# Patient Record
Sex: MALE | Race: BLACK OR AFRICAN AMERICAN | URBAN - METROPOLITAN AREA
[De-identification: names, ages, dates, MRNs, and addresses within clinical notes are randomized per-mention and may not be internally consistent; named-entity substitution may affect disease eponyms.]

---

## 2019-01-22 ENCOUNTER — OFFICE VISIT (OUTPATIENT)
Dept: FAMILY MEDICINE CLINIC | Age: 39
End: 2019-01-22

## 2019-01-22 ENCOUNTER — HOSPITAL ENCOUNTER (OUTPATIENT)
Dept: LAB | Age: 39
Discharge: HOME | End: 2019-01-22
Payer: SELF-PAY

## 2019-01-22 VITALS
BODY MASS INDEX: 35.34 KG/M2 | SYSTOLIC BLOOD PRESSURE: 210 MMHG | DIASTOLIC BLOOD PRESSURE: 110 MMHG | OXYGEN SATURATION: 99 % | HEART RATE: 65 BPM | WEIGHT: 275.4 LBS | RESPIRATION RATE: 14 BRPM | HEIGHT: 74 IN | TEMPERATURE: 97.7 F

## 2019-01-22 DIAGNOSIS — I10 HYPERTENSION, UNCONTROLLED: Primary | ICD-10-CM

## 2019-01-22 DIAGNOSIS — I10 HYPERTENSION, UNCONTROLLED: ICD-10-CM

## 2019-01-22 PROBLEM — E66.01 SEVERE OBESITY (HCC): Status: ACTIVE | Noted: 2019-01-22

## 2019-01-22 LAB
ALBUMIN SERPL-MCNC: 4.4 G/DL (ref 3.4–5)
ALBUMIN/GLOB SERPL: 1.2 {RATIO} (ref 0.8–1.7)
ALP SERPL-CCNC: 77 U/L (ref 45–117)
ALT SERPL-CCNC: 27 U/L (ref 16–61)
ANION GAP SERPL CALC-SCNC: 8 MMOL/L (ref 3–18)
AST SERPL-CCNC: 14 U/L (ref 15–37)
BILIRUB SERPL-MCNC: 0.5 MG/DL (ref 0.2–1)
BUN SERPL-MCNC: 13 MG/DL (ref 7–18)
BUN/CREAT SERPL: 14 (ref 12–20)
CALCIUM SERPL-MCNC: 9.5 MG/DL (ref 8.5–10.1)
CHLORIDE SERPL-SCNC: 104 MMOL/L (ref 100–108)
CO2 SERPL-SCNC: 28 MMOL/L (ref 21–32)
CREAT SERPL-MCNC: 0.92 MG/DL (ref 0.6–1.3)
GLOBULIN SER CALC-MCNC: 3.8 G/DL (ref 2–4)
GLUCOSE SERPL-MCNC: 91 MG/DL (ref 74–99)
POTASSIUM SERPL-SCNC: 4.4 MMOL/L (ref 3.5–5.5)
PROT SERPL-MCNC: 8.2 G/DL (ref 6.4–8.2)
SODIUM SERPL-SCNC: 140 MMOL/L (ref 136–145)

## 2019-01-22 PROCEDURE — 80053 COMPREHEN METABOLIC PANEL: CPT

## 2019-01-22 RX ORDER — AMLODIPINE BESYLATE 10 MG/1
10 TABLET ORAL DAILY
Qty: 30 TAB | Refills: 1 | Status: SHIPPED | OUTPATIENT
Start: 2019-01-22 | End: 2019-03-21 | Stop reason: SDUPTHER

## 2019-01-22 RX ORDER — LISINOPRIL 40 MG/1
40 TABLET ORAL DAILY
COMMUNITY
End: 2019-01-22 | Stop reason: SDUPTHER

## 2019-01-22 RX ORDER — LISINOPRIL 40 MG/1
40 TABLET ORAL DAILY
Qty: 30 TAB | Refills: 2 | Status: SHIPPED | OUTPATIENT
Start: 2019-01-22 | End: 2019-04-28 | Stop reason: SDUPTHER

## 2019-01-22 NOTE — TELEPHONE ENCOUNTER
Pt was in the office today for an appt.  He did not get refill on   Following medication(s) for Felicia Settler 1980 uploaded and pended for review to Dr. Vera Number :   lisinopril (PRINIVIL, ZESTRIL) 40 mg tablet [[id number]]   Order Details   Dose: 40 mg Route: Oral Frequency: DAILY   Dispense Quantity: -- Refills: -- Fills remaining: --           Sig: Take 40 mg by mouth daily.

## 2019-01-22 NOTE — PROGRESS NOTES
Vee Escalona is a 45 y.o. male  presents for establish care. He has history of HTN and is on ace. No complaints. No Known Allergies  Outpatient Medications Marked as Taking for the 1/22/19 encounter (Office Visit) with Lisha Muro, MD   Medication Sig Dispense Refill    lisinopril (PRINIVIL, ZESTRIL) 40 mg tablet Take 40 mg by mouth daily.  amLODIPine (NORVASC) 10 mg tablet Take 1 Tab by mouth daily. 30 Tab 1     Patient Active Problem List   Diagnosis Code    Severe obesity ([facility] [address] E66.01    Hypertension, uncontrolled I10     Past Medical History:   Diagnosis Date    Hypertension      Social History     Socioeconomic History    Marital status:      Spouse name: Not on file    Number of children: Not on file    Years of education: Not on file    Highest education level: Not on file   Tobacco Use    Smoking status: Former Smoker    Smokeless tobacco: Never Used   Substance and Sexual Activity    Alcohol use: Yes     Comment: Occasional      Family History   Problem Relation Age of Onset    Hypertension Mother     Hypertension Father         Review of Systems   Constitutional: Negative for chills, fever, malaise/fatigue and weight loss. Eyes: Negative for blurred vision. Respiratory: Negative for shortness of breath and wheezing. Cardiovascular: Negative for chest pain. Gastrointestinal: Negative for nausea and vomiting. Musculoskeletal: Negative for myalgias. Skin: Negative for rash. Neurological: Negative for weakness. Psychiatric/Behavioral: Negative. Vitals:    01/22/19 1055   BP: (!) 210/110   Pulse: 65   Resp: 14   Temp: 97.7 °F (36.5 °C)   TempSrc: Oral   SpO2: 99%   Weight: 275 lb 6.4 oz (124.9 kg)   Height: 6' 2\" (1.88 m)   PainSc:   0 - No pain       Physical Exam   Constitutional: He is oriented to person, place, and time and well-developed, well-nourished, and in no distress. Neck: Normal range of motion. Neck supple. No thyromegaly present. Cardiovascular: Normal rate, regular rhythm and normal heart sounds. Pulmonary/Chest: Effort normal and breath sounds normal.   Musculoskeletal: Normal range of motion. Neurological: He is alert and oriented to person, place, and time. Skin: Skin is warm and dry. Nursing note and vitals reviewed. Assessment/Plan      ICD-10-CM ICD-9-CM    1. Hypertension, uncontrolled I10 401.9 amLODIPine (NORVASC) 10 mg tablet      METABOLIC PANEL, COMPREHENSIVE     Continue Lisinopril but add norvasc    I have discussed the diagnosis with the patient and the intended plan of care as seen in the above orders. The patient has received an after-visit summary and questions were answered concerning future plans. I have discussed medication, side effects, and warnings with the patient in detail. The patient verbalized understanding and is in agreement with the plan of care. The patient will contact the office with any additional concerns. Follow-up Disposition:  Return in about 6 days (around 1/28/2019).   lab results and schedule of future lab studies reviewed with patient    Lina Chung MD

## 2019-01-22 NOTE — PATIENT INSTRUCTIONS
Low Sodium Diet (2,000 Milligram): Care Instructions  Your Care Instructions    Too much sodium causes your body to hold on to extra water. This can raise your blood pressure and force your heart and kidneys to work harder. In very serious cases, this could cause you to be put in the hospital. It might even be life-threatening. By limiting sodium, you will feel better and lower your risk of serious problems. The most common source of sodium is salt. People get most of the salt in their diet from canned, prepared, and packaged foods. Fast food and restaurant meals also are very high in sodium. Your doctor will probably limit your sodium to less than 2,000 milligrams (mg) a day. This limit counts all the sodium in prepared and packaged foods and any salt you add to your food. Follow-up care is a key part of your treatment and safety. Be sure to make and go to all appointments, and call your doctor if you are having problems. It's also a good idea to know your test results and keep a list of the medicines you take. How can you care for yourself at home? Read food labels  · Read labels on cans and food packages. The labels tell you how much sodium is in each serving. Make sure that you look at the serving size. If you eat more than the serving size, you have eaten more sodium. · Food labels also tell you the Percent Daily Value for sodium. Choose products with low Percent Daily Values for sodium. · Be aware that sodium can come in forms other than salt, including monosodium glutamate (MSG), sodium citrate, and sodium bicarbonate (baking soda). MSG is often added to Asian food. When you eat out, you can sometimes ask for food without MSG or added salt. Buy low-sodium foods  · Buy foods that are labeled \"unsalted\" (no salt added), \"sodium-free\" (less than 5 mg of sodium per serving), or \"low-sodium\" (less than 140 mg of sodium per serving).  Foods labeled \"reduced-sodium\" and \"light sodium\" may still have too much sodium. Be sure to read the label to see how much sodium you are getting. · Buy fresh vegetables, or frozen vegetables without added sauces. Buy low-sodium versions of canned vegetables, soups, and other canned goods. Prepare low-sodium meals  · Cut back on the amount of salt you use in cooking. This will help you adjust to the taste. Do not add salt after cooking. One teaspoon of salt has about 2,300 mg of sodium. · Take the salt shaker off the table. · Flavor your food with garlic, lemon juice, onion, vinegar, herbs, and spices. Do not use soy sauce, lite soy sauce, steak sauce, onion salt, garlic salt, celery salt, mustard, or ketchup on your food. · Use low-sodium salad dressings, sauces, and ketchup. Or make your own salad dressings and sauces without adding salt. · Use less salt (or none) when recipes call for it. You can often use half the salt a recipe calls for without losing flavor. Other foods such as rice, pasta, and grains do not need added salt. · Rinse canned vegetables, and cook them in fresh water. This removes some--but not all--of the salt. · Avoid water that is naturally high in sodium or that has been treated with water softeners, which add sodium. Call your local water company to find out the sodium content of your water supply. If you buy bottled water, read the label and choose a sodium-free brand. Avoid high-sodium foods  · Avoid eating:  ? Smoked, cured, salted, and canned meat, fish, and poultry. ? Ham, pinzon, hot dogs, and luncheon meats. ? Regular, hard, and processed cheese and regular peanut butter. ? Crackers with salted tops, and other salted snack foods such as pretzels, chips, and salted popcorn. ? Frozen prepared meals, unless labeled low-sodium. ? Canned and dried soups, broths, and bouillon, unless labeled sodium-free or low-sodium. ? Canned vegetables, unless labeled sodium-free or low-sodium. ? Western Aimee fries, pizza, tacos, and other fast foods.   ? Cassi Will olives, ketchup, and other condiments, especially soy sauce, unless labeled sodium-free or low-sodium. Where can you learn more? Go to http://ghulam-rabia.info/. Enter K302 in the search box to learn more about \"Low Sodium Diet (2,000 Milligram): Care Instructions. \"  Current as of: March 28, 2018  Content Version: 11.9  © 0495-3796 [employer]. Care instructions adapted under license by [employer] (which disclaims liability or warranty for this information). If you have questions about a medical condition or this instruction, always ask your healthcare professional. [address] any warranty or liability for your use of this information.

## 2019-01-28 ENCOUNTER — CLINICAL SUPPORT (OUTPATIENT)
Dept: FAMILY MEDICINE CLINIC | Age: 39
End: 2019-01-28

## 2019-01-28 VITALS — DIASTOLIC BLOOD PRESSURE: 100 MMHG | SYSTOLIC BLOOD PRESSURE: 160 MMHG

## 2019-01-28 DIAGNOSIS — I10 HYPERTENSION, UNCONTROLLED: Primary | ICD-10-CM

## 2019-01-28 NOTE — PROGRESS NOTES
[place] Elam 45 y.o. male being seen BP check     Bp was 160/100 the patient stated he has no headache, SOB, Chest pain, or nausea    Informed Dr. Vicki Coello of BP he said to have the patient return for nurse visit in 2 weeks for BP f/u.    the patient expressed clear understanding and did not have any questions.     SMD

## 2019-03-21 DIAGNOSIS — I10 HYPERTENSION, UNCONTROLLED: ICD-10-CM

## 2019-03-21 RX ORDER — AMLODIPINE BESYLATE 10 MG/1
10 TABLET ORAL DAILY
Qty: 30 TAB | Refills: 1 | Status: SHIPPED | OUTPATIENT
Start: 2019-03-21 | End: 2019-05-21 | Stop reason: SDUPTHER

## 2019-03-21 NOTE — TELEPHONE ENCOUNTER
Patient called at 10:03 requesting a refill on his amLODIPine (NORVASC) 10 mg tablet. Please call patient if you have any questions.

## 2019-03-21 NOTE — TELEPHONE ENCOUNTER
Patient was seen on 1/22/19 for HTN (initial visit) did not come back for his nurse visit (no showed). Asking for refills on his blood pressure medication please advise.

## 2019-04-29 RX ORDER — LISINOPRIL 40 MG/1
TABLET ORAL
Qty: 30 TAB | Refills: 2 | Status: SHIPPED | OUTPATIENT
Start: 2019-04-29 | End: 2019-08-10 | Stop reason: SDUPTHER

## 2019-05-21 DIAGNOSIS — I10 HYPERTENSION, UNCONTROLLED: ICD-10-CM

## 2019-05-21 RX ORDER — AMLODIPINE BESYLATE 10 MG/1
TABLET ORAL
Qty: 30 TAB | Refills: 1 | Status: SHIPPED | OUTPATIENT
Start: 2019-05-21 | End: 2019-07-21 | Stop reason: SDUPTHER

## 2019-07-21 DIAGNOSIS — I10 HYPERTENSION, UNCONTROLLED: ICD-10-CM

## 2019-07-22 RX ORDER — AMLODIPINE BESYLATE 10 MG/1
TABLET ORAL
Qty: 30 TAB | Refills: 1 | Status: SHIPPED | OUTPATIENT
Start: 2019-07-22 | End: 2019-09-26 | Stop reason: SDUPTHER

## 2019-08-12 RX ORDER — LISINOPRIL 40 MG/1
TABLET ORAL
Qty: 30 TAB | Refills: 2 | Status: SHIPPED | OUTPATIENT
Start: 2019-08-12 | End: 2021-08-23

## 2019-09-26 DIAGNOSIS — I10 HYPERTENSION, UNCONTROLLED: ICD-10-CM

## 2019-09-26 RX ORDER — AMLODIPINE BESYLATE 10 MG/1
TABLET ORAL
Qty: 30 TAB | Refills: 1 | Status: SHIPPED | OUTPATIENT
Start: 2019-09-26 | End: 2021-10-05 | Stop reason: SDUPTHER

## 2019-11-20 RX ORDER — LISINOPRIL 40 MG/1
TABLET ORAL
Qty: 30 TAB | Refills: 1 | OUTPATIENT
Start: 2019-11-20

## 2019-11-25 RX ORDER — LISINOPRIL 40 MG/1
TABLET ORAL
Qty: 30 TAB | Refills: 1 | OUTPATIENT
Start: 2019-11-25

## 2019-12-03 ENCOUNTER — TELEPHONE (OUTPATIENT)
Dept: FAMILY MEDICINE CLINIC | Age: 39
End: 2019-12-03

## 2019-12-03 NOTE — TELEPHONE ENCOUNTER
Mr. Erum Lay called this morning to request a refill on his blood pressure medication. I informed him that he hasn't been seen since January 2019 and that a follow up is required. He states he doesn't have insurance and that there was a money issue last time. I explained that we would be more than happy to work with him on payments. He insisted that Dr. Akash Glover would fill his medication without him being seen for an appointment. He stated his wife was in here earlier & Dr. Akash Glover informed her to tell Mr. Erum Lay to call up here to get his refill. I again explained that an appointment would be necessary, as it's been almost a year. He asked to speak with Dr. Akash Glover directly. I informed him I would pass a message along but Dr. Akash Glover was unavailable to talk right now. He was persistent even still following that. I asked him to hold. I went back to Dr. Zbigniew Arroyo office, consulted with him about the situation. Dr. Akash Glover said he did not speak to his wife about anything, and the lady he seen this morning, he didn't even know she was . I informed Dr. Akash Glover that patient was insisting that he said for him to call up here. Dr. Akash Glover didn't say any of that. Dr. Akash Glover advised me that the patient needs an appointment & needs to be seen. At that moment, I took the call in Dr. Zbigniew Arroyo office, informed the patient exactly what Dr. Akash Glover said. Mr. Erum Lay became furious and said \"this is fucking outrageous, y'all are out about fucking money, fuck you. \"     Call was disconnected. Dr. Akash Glover aware of what the patient said and suggested he not be seen here again. Msg was forwarded to [facility] for review.

## 2019-12-11 NOTE — TELEPHONE ENCOUNTER
I spoke with Dr. Darell Booker who had expressed no longer wanting to treat this patient after his language used in his conversation with [facility] and then patient never followed recommendation to schedule an appointment. I discussed this case also with my manager, Margareth Garcia. Discharge letter was sent today 12/11/19. Will provide care through 1/11/2020.

## 2020-09-21 ENCOUNTER — OFFICE VISIT (OUTPATIENT)
Dept: ORTHOPEDIC SURGERY | Facility: CLINIC | Age: 40
End: 2020-09-21

## 2020-09-21 VITALS
BODY MASS INDEX: 33.65 KG/M2 | HEART RATE: 72 BPM | HEIGHT: 74 IN | RESPIRATION RATE: 16 BRPM | TEMPERATURE: 97.1 F | SYSTOLIC BLOOD PRESSURE: 147 MMHG | WEIGHT: 262.2 LBS | DIASTOLIC BLOOD PRESSURE: 94 MMHG

## 2020-09-21 DIAGNOSIS — M17.11 PRIMARY OSTEOARTHRITIS OF RIGHT KNEE: ICD-10-CM

## 2020-09-21 DIAGNOSIS — M25.562 LEFT KNEE PAIN, UNSPECIFIED CHRONICITY: ICD-10-CM

## 2020-09-21 DIAGNOSIS — M25.462 EFFUSION OF LEFT KNEE: ICD-10-CM

## 2020-09-21 DIAGNOSIS — M17.12 PRIMARY OSTEOARTHRITIS OF LEFT KNEE: Primary | ICD-10-CM

## 2020-09-21 RX ORDER — BETAMETHASONE SODIUM PHOSPHATE AND BETAMETHASONE ACETATE 3; 3 MG/ML; MG/ML
6 INJECTION, SUSPENSION INTRA-ARTICULAR; INTRALESIONAL; INTRAMUSCULAR; SOFT TISSUE ONCE
Qty: 0.5 ML | Refills: 0
Start: 2020-09-21 | End: 2020-09-21

## 2020-09-21 NOTE — PROGRESS NOTES
Patient: Morgan Zamudio                MRN: [id number]       SSN: [id number]  YOB: 1980        AGE: 44 y.o. SEX: male    PCP: No primary care provider on file.  09/21/20    CC: LEFT KNEE PAIN AND LEFT KNEE EFFUSION    HISTORY:  Morgan Zamudio is a 44 y.o. male who is seen for left knee pain. He has been experiencing left knee pain for the past month with increased pain this past week. He has been running around a lot with his newly acquired beagle puppy. He has been doing a lot of kneeling at work as a . He also finishing a [employer]  job. He states he has had prior knee swelling episodes while playing basketball years ago. He feels pain with standing, walking and stair climbing. He experiences startup pain after sitting. He has been wearing a Shankar Copper knee brace with some relief. 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 He lives in [place] with his wife and special needs brother-in-law. He has a daughter that goes to [employer]. He has an 5 week old beagle named Angelica, but he calls her Little Kenyain. Mr. Mariela Hesetr weighs 262 lbs and is 6'2\" tall.        No results found for: HBA1C, HGBE8, CYU4IYUH, JWG0ABOU, BQR7XISS  Weight Metrics 9/21/2020 1/22/2019   Weight 262 lb 3.2 oz 275 lb 6.4 oz   BMI 33.66 kg/m2 35.36 kg/m2       Patient Active Problem List   Diagnosis Code    Severe obesity ([facility] [address] E66.01    Hypertension, uncontrolled I10     REVIEW OF SYSTEMS:    Constitutional Symptoms: Negative   Eyes: Negative   Ears, Nose, Throat and Mouth: Negative   Cardiovascular: Negative   Respiratory: Negative   Genitourinary: Per HPI   Gastrointestinal: Per HPI   Integumentary (Skin and/or Breast): Negative   Musculoskeletal: Per HPI   Endocrine/Rheumatologic: Negative   Neurological: Per HPI   Hematology/Lymphatic: Negative    Allergic/Immunologic: Negative   Phychiatric: Negative    Social History     Socioeconomic History    Marital status:      Spouse name: Not on file    Number of children: Not on file    Years of education: Not on file    Highest education level: Not on file   Occupational History    Not on file   Social Needs    Financial resource strain: Not on file    Food insecurity     Worry: Not on file     Inability: Not on file    Transportation needs     Medical: Not on file     Non-medical: Not on file   Tobacco Use    Smoking status: Former Smoker    Smokeless tobacco: Never Used   Substance and Sexual Activity    Alcohol use: Yes     Comment: Occasional     Drug use: Not on file    Sexual activity: Not on file   Lifestyle    Physical activity     Days per week: Not on file     Minutes per session: Not on file    Stress: Not on file   Relationships    Social connections     Talks on phone: Not on file     Gets together: Not on file     Attends Nondenominational service: Not on file     Active member of club or organization: Not on file     Attends meetings of clubs or organizations: Not on file     Relationship status: Not on file    Intimate partner violence     Fear of current or ex partner: Not on file     Emotionally abused: Not on file     Physically abused: Not on file     Forced sexual activity: Not on file   Other Topics Concern    Not on file   Social History Narrative    Not on file      No Known Allergies   Current Outpatient Medications   Medication Sig    amLODIPine (NORVASC) 10 mg tablet TAKE 1 TABLET BY MOUTH ONCE DAILY    lisinopril (PRINIVIL, ZESTRIL) 40 mg tablet take 1 tablet by mouth once daily     No current facility-administered medications for this visit. PHYSICAL EXAMINATION:  Visit Vitals  BP (!) 147/94 (BP 1 Location: Left arm, BP Patient Position: Sitting)   Pulse 72   Temp 97.1 °F (36.2 °C) (Temporal)   Resp 16   Ht 6' 2\" (1.88 m)   Wt 262 lb 3.2 oz (118.9 kg)   BMI 33.66 kg/m²      ORTHO EXAMINATION:  Examination Right knee Left knee   Skin Intact Intact   Range of motion 120-0 115-0   Effusion - +++   Medial joint line tenderness - +   Lateral joint line tenderness - -   Popliteal tenderness - -   Osteophytes palpable - +   Liangs - -   Patella crepitus - -   Anterior drawer - -   Lateral laxity - -   Medial laxity - -   Varus deformity - -   Valgus deformity - -   Pretibial edema - -   Calf tenderness - -       TIME OUT:  Chart reviewed for the following:   IPatricia MD, have reviewed the History, Physical and updated the Allergic reactions for [address] performed immediately prior to start of procedure:  Maico Stanley MD, have performed the following reviews on Kayode Fears prior to the start of the procedure:          * Patient was identified by name and date of birth   * Agreement on procedure being performed was verified  * Risks and Benefits explained to the patient  * Procedure site verified and marked as necessary  * Patient was positioned for comfort  * Consent was obtained     Time: 3:06 PM     Date of procedure: 9/21/2020  Procedure performed by:  Patricia Wilkinson MD  Mr. Stuart Nevarez tolerated the procedure well with no complications. RADIOGRAPHS:  XR BILAT KNEE 9/21/20  IMPRESSION:  Three views - No fractures, no effusion, mild joint space narrowing, no osteophytes present. Kellgren Dylan grade 1. IMPRESSION:      ICD-10-CM ICD-9-CM    1.  Primary osteoarthritis of left knee  M17.12 715.16 betamethasone (Celestone Soluspan) 6 mg/mL injection      BETAMETHASONE ACETATE & SODIUM PHOSPHATE INJECTION 3 MG EA. DRAIN/INJECT LARGE JOINT/BURSA   2. Left knee pain, unspecified chronicity  M25.562 719.46 AMB POC X-RAY KNEE 3 VIEW      betamethasone (Celestone Soluspan) 6 mg/mL injection      BETAMETHASONE ACETATE & SODIUM PHOSPHATE INJECTION 3 MG EA.      DRAIN/INJECT LARGE JOINT/BURSA   3. Primary osteoarthritis of right knee  M17.11 715.16    4. Effusion of left knee  M25.462 719.06      PLAN:  We discussed a possible need for left knee MRI in the future if pain continues. Attempted aspiration yielded no fluid. After discussing treatment options, patient's left was injected with 4 cc Marcaine and 1/2 cc Celestone. There is no need for surgery at this time. He will follow up as needed.       Scribed by Reginaldo Calero ([address]) as dictated by Glenroy Marquez MD

## 2021-08-11 NOTE — PROGRESS NOTES
08/23/21          ICD-10-CM ICD-9-CM    1. Hypertension, uncontrolled  I10 401.9 lisinopril-hydroCHLOROthiazide (PRINZIDE, ZESTORETIC) 20-25 mg per tablet     Follow-up and Dispositions    · Return in about 4 months (around 12/23/2021) for Follow up on illness. Assessment and Plan  The patient has essentially hypertension. Stop lisinopril. Switch to Prinzide 20/25. Recheck in 4 months. We will obtain records from [facility].  If they did not draw a lipid panel will call him back and order them. Unhealthy weight. Discussed diet exercise and gave him a copy of the Mediterranean diet. Lab results and schedule of future lab studies reviewed with patient  reviewed diet, exercise and weight control  All questions were answered and understood. Health Maintenance Due   Topic Date Due    Hepatitis C Screening  Never done    COVID-19 Vaccine (1) Never done    DTaP/Tdap/Td series (1 - Tdap) Never done    Lipid Screen  Never done       Subjective: Naima Diggs is a 36 y.o. male has Severe obesity ([address] and Hypertension, uncontrolled on their problem list.. No chief complaint on file. There is a history of hypertension and unhealthy weight. Seen by orthopedics in September 2020 for left knee pain and effusion. Seen in January 2019 for hypertension by primary care. [facility] May for swelling of the right supraclavicular region. Thought to be combination of adenopathy and lipoma    Hypertension  The patient has had no problem with the medication. The patient has no headaches, visual changes, chest pain or pressure,dyspnea, orthopnea, abdominal pain, dysuria, weakness, or paresthesias.   BP Readings from Last 3 Encounters:   08/23/21 (!) 154/101   09/21/20 (!) 147/94   01/28/19 (!) 160/100     Lab Results   Component Value Date/Time    Sodium 140 01/22/2019 11:10 AM    Potassium 4.4 01/22/2019 11:10 AM    Chloride 104 01/22/2019 11:10 AM    CO2 28 01/22/2019 11:10 AM    Anion gap 8 01/22/2019 11:10 AM    Glucose 91 01/22/2019 11:10 AM    BUN 13 01/22/2019 11:10 AM    Creatinine 0.92 01/22/2019 11:10 AM    BUN/Creatinine ratio 14 01/22/2019 11:10 AM    GFR est AA >60 01/22/2019 11:10 AM    GFR est non-AA >60 01/22/2019 11:10 AM    Calcium 9.5 01/22/2019 11:10 AM       Key CAD CHF Meds             amLODIPine (NORVASC) 10 mg tablet TAKE 1 TABLET BY MOUTH ONCE DAILY    lisinopril (PRINIVIL, ZESTRIL) 40 mg tablet take 1 tablet by mouth once daily          Right supraclavicular area swelling. Onset May 1, 2 weeks after Covid vaccination which was done on 4/22/2021. [facility] and x-rays were done and labs were done also. Nothing was found that was abnormal.  It was thought that he perhaps had adenopathy at one point. He took pills for lymphatic swelling and improved. He is left-handed. Overweight  The patient states that the weight has been elevated for several years. .  Patient's diet is not restricted. .  The patient denies edema of the lower extremities. Obesity comorbid conditions include high blood pressure   body mass index is unknown because there is no height or weight on file. Wt Readings from Last 3 Encounters:   08/23/21 279 lb (126.6 kg)   09/21/20 262 lb 3.2 oz (118.9 kg)   01/22/19 275 lb 6.4 oz (124.9 kg)       BMI Readings from Last 3 Encounters:   08/23/21 35.82 kg/m²   09/21/20 33.66 kg/m²   01/22/19 35.36 kg/m²           Current Outpatient Medications   Medication Sig    amLODIPine (NORVASC) 10 mg tablet TAKE 1 TABLET BY MOUTH ONCE DAILY    lisinopril (PRINIVIL, ZESTRIL) 40 mg tablet take 1 tablet by mouth once daily     No current facility-administered medications for this visit. has Severe obesity (HCC) and Hypertension, uncontrolled on their problem list.    No past surgical history on file. reports that he has quit smoking.  He has never used smokeless tobacco. He reports current alcohol use.  family history includes Hypertension in his father and mother. Review of Systems   Constitutional: Negative for chills and fever. HENT: Negative for hearing loss. Eyes: Negative for blurred vision, discharge and redness. Respiratory: Negative for cough and shortness of breath. Cardiovascular: Negative for chest pain, palpitations, orthopnea and leg swelling. Gastrointestinal: Negative for abdominal pain, blood in stool, constipation, diarrhea and melena. Genitourinary: Negative for dysuria, frequency and urgency. Musculoskeletal: Negative for joint pain and myalgias. Skin: Negative for rash. Neurological: Negative for dizziness, focal weakness and headaches. Psychiatric/Behavioral: Negative for depression. The patient is not nervous/anxious. Visit Vitals  BP (!) 154/101 (BP 1 Location: Right arm)   Pulse 67   Temp 98 °F (36.7 °C) (Oral)   Resp 18   Ht 6' 2\" (1.88 m)   Wt 279 lb (126.6 kg)   SpO2 95%   BMI 35.82 kg/m²       Physical Exam  Vitals and nursing note reviewed. Constitutional:       Appearance: He is well-developed. HENT:      Head: Normocephalic and atraumatic. Right Ear: External ear normal.      Left Ear: External ear normal.      Nose: Nose normal.   Eyes:      General: No scleral icterus. Right eye: No discharge. Conjunctiva/sclera: Conjunctivae normal.      Pupils: Pupils are equal, round, and reactive to light. Neck:      Thyroid: No thyromegaly. Vascular: No JVD. Cardiovascular:      Rate and Rhythm: Normal rate and regular rhythm. Heart sounds: No murmur heard. No friction rub. No gallop. Pulmonary:      Effort: No respiratory distress. Breath sounds: No wheezing or rales. Chest:      Chest wall: No tenderness. Abdominal:      General: There is no distension. Palpations: There is no mass. Tenderness: There is no abdominal tenderness. There is no guarding or rebound. Musculoskeletal:         General: Normal range of motion.       Cervical back: Normal range of motion and neck supple. Comments: Soft fleshy swelling in the area of the right supraclavicular area. Nontender. I feel no adenopathy. Lymphadenopathy:      Cervical: No cervical adenopathy. Skin:     General: Skin is warm and dry. Findings: No erythema or rash. Neurological:      Mental Status: He is alert and oriented to person, place, and time. Cranial Nerves: No cranial nerve deficit. Coordination: Coordination normal.   Psychiatric:         Behavior: Behavior normal.         Judgment: Judgment normal.          We discussed the expected course, resolution and complications of the diagnosis(es) in detail. Medication risks, benefits, costs, interactions, and alternatives were discussed as indicated. I advised him to contact the office if his condition worsens, changes or fails to improve as anticipated. He expressed understanding with the diagnosis(es) and plan. This note was done with the assistance of dragon speech software.   Some inadvertent errors or omissions may be present

## 2021-08-23 ENCOUNTER — OFFICE VISIT (OUTPATIENT)
Dept: FAMILY MEDICINE CLINIC | Age: 41
End: 2021-08-23
Payer: MEDICAID

## 2021-08-23 VITALS
BODY MASS INDEX: 35.81 KG/M2 | HEIGHT: 74 IN | DIASTOLIC BLOOD PRESSURE: 101 MMHG | TEMPERATURE: 98 F | WEIGHT: 279 LBS | SYSTOLIC BLOOD PRESSURE: 154 MMHG | HEART RATE: 67 BPM | RESPIRATION RATE: 18 BRPM | OXYGEN SATURATION: 95 %

## 2021-08-23 DIAGNOSIS — D17.0 LIPOMA OF NECK: ICD-10-CM

## 2021-08-23 DIAGNOSIS — I10 HYPERTENSION, UNCONTROLLED: Primary | ICD-10-CM

## 2021-08-23 DIAGNOSIS — E66.01 SEVERE OBESITY (HCC): ICD-10-CM

## 2021-08-23 PROCEDURE — 99203 OFFICE O/P NEW LOW 30 MIN: CPT | Performed by: EMERGENCY MEDICINE

## 2021-08-23 RX ORDER — LISINOPRIL AND HYDROCHLOROTHIAZIDE 20; 25 MG/1; MG/1
1 TABLET ORAL DAILY
Qty: 90 TABLET | Refills: 3 | Status: SHIPPED | OUTPATIENT
Start: 2021-08-23 | End: 2022-08-19

## 2021-08-23 NOTE — PATIENT INSTRUCTIONS
DASH Diet: Care Instructions  Your Care Instructions     The DASH diet is an eating plan that can help lower your blood pressure. DASH stands for Dietary Approaches to Stop Hypertension. Hypertension is high blood pressure. The DASH diet focuses on eating foods that are high in calcium, potassium, and magnesium. These nutrients can lower blood pressure. The foods that are highest in these nutrients are fruits, vegetables, low-fat dairy products, nuts, seeds, and legumes. But taking calcium, potassium, and magnesium supplements instead of eating foods that are high in those nutrients does not have the same effect. The DASH diet also includes whole grains, fish, and poultry. The DASH diet is one of several lifestyle changes your doctor may recommend to lower your high blood pressure. Your doctor may also want you to decrease the amount of sodium in your diet. Lowering sodium while following the DASH diet can lower blood pressure even further than just the DASH diet alone. Follow-up care is a key part of your treatment and safety. Be sure to make and go to all appointments, and call your doctor if you are having problems. It's also a good idea to know your test results and keep a list of the medicines you take. How can you care for yourself at home? Following the DASH diet  · Eat 4 to 5 servings of fruit each day. A serving is 1 medium-sized piece of fruit, ½ cup chopped or canned fruit, 1/4 cup dried fruit, or 4 ounces (½ cup) of fruit juice. Choose fruit more often than fruit juice. · Eat 4 to 5 servings of vegetables each day. A serving is 1 cup of lettuce or raw leafy vegetables, ½ cup of chopped or cooked vegetables, or 4 ounces (½ cup) of vegetable juice. Choose vegetables more often than vegetable juice. · Get 2 to 3 servings of low-fat and fat-free dairy each day. A serving is 8 ounces of milk, 1 cup of yogurt, or 1 ½ ounces of cheese. · Eat 6 to 8 servings of grains each day.  A serving is 1 slice of bread, 1 ounce of dry cereal, or ½ cup of cooked rice, pasta, or cooked cereal. Try to choose whole-grain products as much as possible. · Limit lean meat, poultry, and fish to 2 servings each day. A serving is 3 ounces, about the size of a deck of cards. · Eat 4 to 5 servings of nuts, seeds, and legumes (cooked dried beans, lentils, and split peas) each week. A serving is 1/3 cup of nuts, 2 tablespoons of seeds, or ½ cup of cooked beans or peas. · Limit fats and oils to 2 to 3 servings each day. A serving is 1 teaspoon of vegetable oil or 2 tablespoons of salad dressing. · Limit sweets and added sugars to 5 servings or less a week. A serving is 1 tablespoon jelly or jam, ½ cup sorbet, or 1 cup of lemonade. · Eat less than 2,300 milligrams (mg) of sodium a day. If you limit your sodium to 1,500 mg a day, you can lower your blood pressure even more. · Be aware that all of these are the suggested number of servings for people who eat 1,800 to 2,000 calories a day. Your recommended number of servings may be different if you need more or fewer calories. Tips for success  · Start small. Do not try to make dramatic changes to your diet all at once. You might feel that you are missing out on your favorite foods and then be more likely to not follow the plan. Make small changes, and stick with them. Once those changes become habit, add a few more changes. · Try some of the following:  ? Make it a goal to eat a fruit or vegetable at every meal and at snacks. This will make it easy to get the recommended amount of fruits and vegetables each day. ? Try yogurt topped with fruit and nuts for a snack or healthy dessert. ? Add lettuce, tomato, cucumber, and onion to sandwiches. ? Combine a ready-made pizza crust with low-fat mozzarella cheese and lots of vegetable toppings. Try using tomatoes, squash, spinach, broccoli, carrots, cauliflower, and onions. ?  Have a variety of cut-up vegetables with a low-fat dip as an appetizer instead of chips and dip. ? Sprinkle sunflower seeds or chopped almonds over salads. Or try adding chopped walnuts or almonds to cooked vegetables. ? Try some vegetarian meals using beans and peas. Add garbanzo or kidney beans to salads. Make burritos and tacos with mashed melara beans or black beans. Where can you learn more? Go to http://www.liu.com/  Enter H967 in the search box to learn more about \"DASH Diet: Care Instructions. \"  Current as of: August 31, 2020               Content Version: 12.8  © 1592-1625 [employer]. Care instructions adapted under license by [employer] (which disclaims liability or warranty for this information). If you have questions about a medical condition or this instruction, always ask your healthcare professional. [address] any warranty or liability for your use of this information. High Blood Pressure: Care Instructions  Overview     It's normal for blood pressure to go up and down throughout the day. But if it stays up, you have high blood pressure. Another name for high blood pressure is hypertension. Despite what a lot of people think, high blood pressure usually doesn't cause headaches or make you feel dizzy or lightheaded. It usually has no symptoms. But it does increase your risk of stroke, heart attack, and other problems. You and your doctor will talk about your risks of these problems based on your blood pressure. Your doctor will give you a goal for your blood pressure. Your goal will be based on your health and your age. Lifestyle changes, such as eating healthy and being active, are always important to help lower blood pressure. You might also take medicine to reach your blood pressure goal.  Follow-up care is a key part of your treatment and safety. Be sure to make and go to all appointments, and call your doctor if you are having problems.  It's also a good idea to know your test results and keep a list of the medicines you take. How can you care for yourself at home? Medical treatment  · If you stop taking your medicine, your blood pressure will go back up. You may take one or more types of medicine to lower your blood pressure. Be safe with medicines. Take your medicine exactly as prescribed. Call your doctor if you think you are having a problem with your medicine. · Talk to your doctor before you start taking aspirin every day. Aspirin can help certain people lower their risk of a heart attack or stroke. But taking aspirin isn't right for everyone, because it can cause serious bleeding. · See your doctor regularly. You may need to see the doctor more often at first or until your blood pressure comes down. · If you are taking blood pressure medicine, talk to your doctor before you take decongestants or anti-inflammatory medicine, such as ibuprofen. Some of these medicines can raise blood pressure. · Learn how to check your blood pressure at home. Lifestyle changes  · Stay at a healthy weight. This is especially important if you put on weight around the waist. Losing even 10 pounds can help you lower your blood pressure. · If your doctor recommends it, get more exercise. Walking is a good choice. Bit by bit, increase the amount you walk every day. Try for at least 30 minutes on most days of the week. You also may want to swim, bike, or do other activities. · Avoid or limit alcohol. Talk to your doctor about whether you can drink any alcohol. · Try to limit how much sodium you eat to less than 2,300 milligrams (mg) a day. Your doctor may ask you to try to eat less than 1,500 mg a day. · Eat plenty of fruits (such as bananas and oranges), vegetables, legumes, whole grains, and low-fat dairy products. · Lower the amount of saturated fat in your diet. Saturated fat is found in animal products such as milk, cheese, and meat.  Limiting these foods may help you lose weight and also lower your risk for heart disease. · Do not smoke. Smoking increases your risk for heart attack and stroke. If you need help quitting, talk to your doctor about stop-smoking programs and medicines. These can increase your chances of quitting for good. When should you call for help? Call  911 anytime you think you may need emergency care. This may mean having symptoms that suggest that your blood pressure is causing a serious heart or blood vessel problem. Your blood pressure may be over 180/120. For example, call 911 if:    · You have symptoms of a heart attack. These may include:  ? Chest pain or pressure, or a strange feeling in the chest.  ? Sweating. ? Shortness of breath. ? Nausea or vomiting. ? Pain, pressure, or a strange feeling in the back, neck, jaw, or upper belly or in one or both shoulders or arms. ? Lightheadedness or sudden weakness. ? A fast or irregular heartbeat.     · You have symptoms of a stroke. These may include:  ? Sudden numbness, tingling, weakness, or loss of movement in your face, arm, or leg, especially on only one side of your body. ? Sudden vision changes. ? Sudden trouble speaking. ? Sudden confusion or trouble understanding simple statements. ? Sudden problems with walking or balance. ? A sudden, severe headache that is different from past headaches.     · You have severe back or belly pain. Do not wait until your blood pressure comes down on its own. Get help right away. Call your doctor now or seek immediate care if:    · Your blood pressure is much higher than normal (such as 180/120 or higher), but you don't have symptoms.     · You think high blood pressure is causing symptoms, such as:  ? Severe headache.  ? Blurry vision. Watch closely for changes in your health, and be sure to contact your doctor if:    · Your blood pressure measures higher than your doctor recommends at least 2 times.  That means the top number is higher or the bottom number is higher, or both.     · You think you may be having side effects from your blood pressure medicine. Where can you learn more? Go to http://www.gray.com/  Enter [id number] in the search box to learn more about \"High Blood Pressure: Care Instructions. \"  Current as of: August 31, 2020               Content Version: 12.8  © 2006-2021 [employer]. Care instructions adapted under license by [employer] (which disclaims liability or warranty for this information). If you have questions about a medical condition or this instruction, always ask your healthcare professional. [place] [address] any warranty or liability for your use of this information. Body Mass Index: Care Instructions  Your Care Instructions     Body mass index (BMI) can help you see if your weight is raising your risk for health problems. It uses a formula to compare how much you weigh with how tall you are. · A BMI lower than 18.5 is considered underweight. · A BMI between 18.5 and 24.9 is considered healthy. · A BMI between 25 and 29.9 is considered overweight. A BMI of 30 or higher is considered obese. If your BMI is in the normal range, it means that you have a lower risk for weight-related health problems. If your BMI is in the overweight or obese range, you may be at increased risk for weight-related health problems, such as high blood pressure, heart disease, stroke, arthritis or joint pain, and diabetes. If your BMI is in the underweight range, you may be at increased risk for health problems such as fatigue, lower protection (immunity) against illness, muscle loss, bone loss, hair loss, and hormone problems. BMI is just one measure of your risk for weight-related health problems. You may be at higher risk for health problems if you are not active, you eat an unhealthy diet, or you drink too much alcohol or use tobacco products.   Follow-up care is a key part of your treatment and safety. Be sure to make and go to all appointments, and call your doctor if you are having problems. It's also a good idea to know your test results and keep a list of the medicines you take. How can you care for yourself at home? · Practice healthy eating habits. This includes eating plenty of fruits, vegetables, whole grains, lean protein, and low-fat dairy. · If your doctor recommends it, get more exercise. Walking is a good choice. Bit by bit, increase the amount you walk every day. Try for at least 30 minutes on most days of the week. · Do not smoke. Smoking can increase your risk for health problems. If you need help quitting, talk to your doctor about stop-smoking programs and medicines. These can increase your chances of quitting for good. · Limit alcohol to 2 drinks a day for men and 1 drink a day for women. Too much alcohol can cause health problems. If you have a BMI higher than 25  · Your doctor may do other tests to check your risk for weight-related health problems. This may include measuring the distance around your waist. A waist measurement of more than 40 inches in men or 35 inches in women can increase the risk of weight-related health problems. · Talk with your doctor about steps you can take to stay healthy or improve your health. You may need to make lifestyle changes to lose weight and stay healthy, such as changing your diet and getting regular exercise. If you have a BMI lower than 18.5  · Your doctor may do other tests to check your risk for health problems. · Talk with your doctor about steps you can take to stay healthy or improve your health. You may need to make lifestyle changes to gain or maintain weight and stay healthy, such as getting more healthy foods in your diet and doing exercises to build muscle. Where can you learn more?   Go to http://www.gray.com/  Enter S176 in the search box to learn more about \"Body Mass Index: Care Instructions. \"  Current as of: September 23, 2020               Content Version: 12.8  © 6024-9109 [employer]. Care instructions adapted under license by [employer] (which disclaims liability or warranty for this information). If you have questions about a medical condition or this instruction, always ask your healthcare professional. [address] any warranty or liability for your use of this information. Learning About the [address] Diet  What is the Mediterranean diet? The Mediterranean diet is a style of eating rather than a diet plan. It features foods eaten in [place], [place], [place] and [place], and other countries along the [facility]. It emphasizes eating foods like fish, fruits, vegetables, beans, high-fiber breads and whole grains, nuts, and olive oil. This style of eating includes limited red meat, cheese, and sweets. Why choose the Mediterranean diet? A Mediterranean-style diet may improve heart health. It contains more fat than other heart-healthy diets. But the fats are mainly from nuts, unsaturated oils (such as fish oils and olive oil), and certain nut or seed oils (such as canola, soybean, or flaxseed oil). These fats may help protect the heart and blood vessels. How can you get started on the Mediterranean diet? Here are some things you can do to switch to a more Mediterranean way of eating. What to eat  · Eat a variety of fruits and vegetables each day, such as grapes, blueberries, tomatoes, broccoli, peppers, figs, olives, spinach, eggplant, beans, lentils, and chickpeas. · Eat a variety of whole-grain foods each day, such as oats, brown rice, and whole wheat bread, pasta, and couscous. · Eat fish at least 2 times a week. Try tuna, salmon, mackerel, lake trout, herring, or sardines. · Eat moderate amounts of low-fat dairy products, such as milk, cheese, or yogurt.   · Eat moderate amounts of poultry and eggs.  · Choose healthy (unsaturated) fats, such as nuts, olive oil, and certain nut or seed oils like canola, soybean, and flaxseed. · Limit unhealthy (saturated) fats, such as butter, palm oil, and coconut oil. And limit fats found in animal products, such as meat and dairy products made with whole milk. Try to eat red meat only a few times a month in very small amounts. · Limit sweets and desserts to only a few times a week. This includes sugar-sweetened drinks like soda. The Mediterranean diet may also include red wine with your meal--1 glass each day for women and up to 2 glasses a day for men. Tips for eating at home  · Use herbs, spices, garlic, lemon zest, and citrus juice instead of salt to add flavor to foods. · Add avocado slices to your sandwich instead of pinzon. · Have fish for lunch or dinner instead of red meat. Brush the fish with olive oil, and broil or grill it. · Sprinkle your salad with seeds or nuts instead of cheese. · Cook with olive or canola oil instead of butter or oils that are high in saturated fat. · Switch from 2% milk or whole milk to 1% or fat-free milk. · Dip raw vegetables in a vinaigrette dressing or hummus instead of dips made from mayonnaise or sour cream.  · Have a piece of fruit for dessert instead of a piece of cake. Try baked apples, or have some dried fruit. Tips for eating out  · Try broiled, grilled, baked, or poached fish instead of having it fried or breaded. · Ask your  to have your meals prepared with olive oil instead of butter. · Order dishes made with marinara sauce or sauces made from olive oil. Avoid sauces made from cream or mayonnaise. · Choose whole-grain breads, whole wheat pasta and pizza crust, brown rice, beans, and lentils. · Cut back on butter or margarine on bread. Instead, you can dip your bread in a small amount of olive oil. · Ask for a side salad or grilled vegetables instead of french fries or chips.   Where can you learn more?  Go to http://www.gray.com/  Enter O407 in the search box to learn more about \"Learning About the Mediterranean Diet. \"  Current as of: December 17, 2020               Content Version: 12.8  © 2006-2021 Healthwise[employer]. Care instructions adapted under license by [employer] (which disclaims liability or warranty for this information). If you have questions about a medical condition or this instruction, always ask your healthcare professional. [address] any warranty or liability for your use of this information.

## 2021-08-23 NOTE — PROGRESS NOTES
Naima Diggs is a 36 y.o. male that is here for a   Chief Complaint   Patient presents with   [facility]         1. Have you been to the ER, urgent care clinic since your last visit? Hospitalized since your last visit?no    2. Have you seen or consulted any other health care providers outside of the [address] [place] [address] since your last visit? Include any pap smears or colon screening. no      Health Maintenance reviewed - yes.       Upcoming Appts  no      VORB: No orders of the defined types were placed in this encounter.   Indu Marsh MD/ Ned Sanchez MA

## 2021-10-05 DIAGNOSIS — I10 HYPERTENSION, UNCONTROLLED: ICD-10-CM

## 2021-10-05 RX ORDER — AMLODIPINE BESYLATE 10 MG/1
10 TABLET ORAL DAILY
Qty: 30 TABLET | Refills: 3 | Status: SHIPPED | OUTPATIENT
Start: 2021-10-05 | End: 2022-02-09

## 2021-10-05 NOTE — TELEPHONE ENCOUNTER
Requested Prescriptions     Pending Prescriptions Disp Refills    amLODIPine (NORVASC) 10 mg tablet 30 Tablet 1     Sig: Take 1 Tablet by mouth daily.

## 2022-02-09 DIAGNOSIS — I10 HYPERTENSION, UNCONTROLLED: ICD-10-CM

## 2022-02-09 RX ORDER — AMLODIPINE BESYLATE 10 MG/1
TABLET ORAL
Qty: 30 TABLET | Refills: 3 | Status: SHIPPED | OUTPATIENT
Start: 2022-02-09 | End: 2022-06-07

## 2022-03-18 PROBLEM — E66.01 SEVERE OBESITY (HCC): Status: ACTIVE | Noted: 2019-01-22

## 2022-03-19 PROBLEM — I10 HYPERTENSION, UNCONTROLLED: Status: ACTIVE | Noted: 2019-01-22

## 2022-06-06 DIAGNOSIS — I10 HYPERTENSION, UNCONTROLLED: ICD-10-CM

## 2022-06-07 RX ORDER — AMLODIPINE BESYLATE 10 MG/1
TABLET ORAL
Qty: 30 TABLET | Refills: 3 | Status: SHIPPED | OUTPATIENT
Start: 2022-06-07 | End: 2022-06-13

## 2022-06-12 DIAGNOSIS — I10 HYPERTENSION, UNCONTROLLED: ICD-10-CM

## 2022-06-13 RX ORDER — AMLODIPINE BESYLATE 10 MG/1
TABLET ORAL
Qty: 30 TABLET | Refills: 3 | Status: SHIPPED | OUTPATIENT
Start: 2022-06-13 | End: 2022-10-19

## 2022-08-27 DIAGNOSIS — I10 HYPERTENSION, UNCONTROLLED: ICD-10-CM

## 2022-08-27 RX ORDER — LISINOPRIL AND HYDROCHLOROTHIAZIDE 20; 25 MG/1; MG/1
TABLET ORAL
Qty: 90 TABLET | Refills: 0 | Status: SHIPPED | OUTPATIENT
Start: 2022-08-27

## 2022-10-17 DIAGNOSIS — I10 HYPERTENSION, UNCONTROLLED: ICD-10-CM

## 2022-10-19 RX ORDER — AMLODIPINE BESYLATE 10 MG/1
TABLET ORAL
Qty: 30 TABLET | Refills: 3 | Status: SHIPPED | OUTPATIENT
Start: 2022-10-19

## 2022-10-26 NOTE — PROGRESS NOTES
11/08/22          ICD-10-CM ICD-9-CM    1. Hypertension, uncontrolled  I10 401.9 lisinopril-hydroCHLOROthiazide (PRINZIDE, ZESTORETIC) 20-25 mg per tablet      2. Severe obesity ([address]  E66.01 278.01       3. Lipoma of neck  D17.0 214.1 LIPID PANEL      4. Encounter for hepatitis C screening test for low risk patient  Z11.59 V73.89 HEPATITIS C AB      5. Screening for cholesterol level  Z13.220 V77.91       6. Encounter for immunization  Z23 V03.89       7. Needs flu shot  Z23 V04.81 INFLUENZA, FLUARIX, FLULAVAL, FLUZONE (AGE 6 MO+), AFLURIA(AGE 3Y+) IM, PF, 0.5 ML        Follow-up and Dispositions    Return in about 6 months (around 5/8/2023). Assessment and Plan  The patient has essentially hypertension. Well. Continue present regimen. Possible spider bite right lateral lower trunk just above the belt line. Healing. No change in treatment. Unhealthy weight. Weight decreasing. Continue present regimen. Lipid panel to be checked today. Lab results and schedule of future lab studies reviewed with patient  Just had COVID ,getting over it, doing well. Previously vaccinated. All questions were answered and understood. Health Maintenance Due   Topic Date Due    Hepatitis C Screening  Never done    DTaP/Tdap/Td series (1 - Tdap) Never done    Lipid Screen  Never done    COVID-19 Vaccine (3 - Booster for Moderna series) 06/17/2021    Depression Screen  08/23/2022       Subjective: Marilyn Roca is a 39 y.o. male has Severe obesity ([address] and Hypertension, uncontrolled on their problem list..  Chief Complaint   Patient presents with    Follow Up Chronic Condition     There is a history of hypertension and unhealthy weight. Seen by orthopedics in September 2020 for left knee pain and effusion. Seen in January 2019 for hypertension by primary care. [facility] of the right supraclavicular region.   Thought to be combination of adenopathy and lipoma  Spider bite  Four months prior. Left lower trunk area. Initially a blister within an ulcer and now healing. Using bacitracin. Hypertension  The patient has had no problem with the medication. The patient has no headaches, visual changes, chest pain or pressure,dyspnea, orthopnea, abdominal pain, dysuria, weakness, or paresthesias. BP Readings from Last 3 Encounters:   11/08/22 136/82   08/23/21 (!) 154/101   09/21/20 (!) 147/94     Lab Results   Component Value Date/Time    Sodium 140 01/22/2019 11:10 AM    Potassium 4.4 01/22/2019 11:10 AM    Chloride 104 01/22/2019 11:10 AM    CO2 28 01/22/2019 11:10 AM    Anion gap 8 01/22/2019 11:10 AM    Glucose 91 01/22/2019 11:10 AM    BUN 13 01/22/2019 11:10 AM    Creatinine 0.92 01/22/2019 11:10 AM    BUN/Creatinine ratio 14 01/22/2019 11:10 AM    GFR est AA >60 01/22/2019 11:10 AM    GFR est non-AA >60 01/22/2019 11:10 AM    Calcium 9.5 01/22/2019 11:10 AM       Key CAD CHF Meds               lisinopril-hydroCHLOROthiazide (PRINZIDE, ZESTORETIC) 20-25 mg per tablet (Taking) Take 1 Tablet by mouth daily. amLODIPine (NORVASC) 10 mg tablet (Taking) take 1 tablet by mouth once daily            Right supraclavicular area swelling. Onset May 1, 2 weeks after Covid vaccination which was done on 4/22/2021. [facility] done and labs were done also. Nothing was found that was abnormal.  It was thought that he perhaps had adenopathy at one point. He took pills for lymphatic swelling and improved. He is left-handed. Overweight  The patient states that the weight has been elevated for several years. .  Patient's diet is not restricted. .  The patient denies edema of the lower extremities. Obesity comorbid conditions include high blood pressure   body mass index is 34.41 kg/m².   Wt Readings from Last 3 Encounters:   11/08/22 268 lb (121.6 kg)   08/23/21 279 lb (126.6 kg)   09/21/20 262 lb 3.2 oz (118.9 kg)       BMI Readings from Last 3 Encounters:   11/08/22 34.41 kg/m²   08/23/21 35.82 kg/m²   09/21/20 33.66 kg/m²           Current Outpatient Medications   Medication Sig    lisinopril-hydroCHLOROthiazide (PRINZIDE, ZESTORETIC) 20-25 mg per tablet Take 1 Tablet by mouth daily. amLODIPine (NORVASC) 10 mg tablet take 1 tablet by mouth once daily     No current facility-administered medications for this visit. has Severe obesity (HCC) and Hypertension, uncontrolled on their problem list.    History reviewed. No pertinent surgical history. reports that he has quit smoking. He has never used smokeless tobacco. He reports current alcohol use.  family history includes Hypertension in his father and mother. Review of Systems   Constitutional:  Negative for chills and fever. HENT:  Negative for hearing loss. Eyes:  Negative for blurred vision, discharge and redness. Respiratory:  Negative for cough and shortness of breath. Cardiovascular:  Negative for chest pain, palpitations, orthopnea and leg swelling. Gastrointestinal:  Negative for abdominal pain, blood in stool, constipation, diarrhea and melena. Genitourinary:  Negative for dysuria, frequency and urgency. Musculoskeletal:  Negative for joint pain and myalgias. Skin:  Negative for rash. Neurological:  Negative for dizziness, focal weakness and headaches. Psychiatric/Behavioral:  Negative for depression. The patient is not nervous/anxious. Visit Vitals  /82 (BP 1 Location: Left arm, BP Patient Position: Sitting, BP Cuff Size: Large adult)   Pulse 85   Resp 16   Ht 6' 2\" (1.88 m)   Wt 268 lb (121.6 kg)   SpO2 97%   BMI 34.41 kg/m²       Physical Exam  Vitals and nursing note reviewed. Constitutional:       Appearance: He is well-developed. HENT:      Head: Normocephalic and atraumatic. Right Ear: External ear normal.      Left Ear: External ear normal.      Nose: Nose normal.   Eyes:      General: No scleral icterus. Right eye: No discharge.       Conjunctiva/sclera: Conjunctivae normal.      Pupils: Pupils are equal, round, and reactive to light. Neck:      Thyroid: No thyromegaly. Vascular: No JVD. Cardiovascular:      Rate and Rhythm: Normal rate and regular rhythm. Heart sounds: No murmur heard. No friction rub. No gallop. Pulmonary:      Effort: No respiratory distress. Breath sounds: No wheezing or rales. Chest:      Chest wall: No tenderness. Abdominal:      General: There is no distension. Palpations: There is no mass. Tenderness: There is no abdominal tenderness. There is no guarding or rebound. Musculoskeletal:         General: Normal range of motion. Cervical back: Normal range of motion and neck supple. Comments: Soft fleshy swelling in the area of the right supraclavicular area. Nontender. I feel no adenopathy. Lymphadenopathy:      Cervical: No cervical adenopathy. Skin:     General: Skin is warm and dry. Findings: No erythema or rash. Neurological:      Mental Status: He is alert and oriented to person, place, and time. Cranial Nerves: No cranial nerve deficit. Coordination: Coordination normal.   Psychiatric:         Behavior: Behavior normal.         Judgment: Judgment normal.        We discussed the expected course, resolution and complications of the diagnosis(es) in detail. Medication risks, benefits, costs, interactions, and alternatives were discussed as indicated. I advised him to contact the office if his condition worsens, changes or fails to improve as anticipated. He expressed understanding with the diagnosis(es) and plan. This note was done with the assistance of dragon speech software.   Some inadvertent errors or omissions may be present

## 2022-11-08 ENCOUNTER — OFFICE VISIT (OUTPATIENT)
Dept: FAMILY MEDICINE CLINIC | Age: 42
End: 2022-11-08
Payer: COMMERCIAL

## 2022-11-08 ENCOUNTER — HOSPITAL ENCOUNTER (OUTPATIENT)
Dept: LAB | Age: 42
Discharge: HOME | End: 2022-11-08
Payer: COMMERCIAL

## 2022-11-08 VITALS
HEART RATE: 85 BPM | BODY MASS INDEX: 34.39 KG/M2 | OXYGEN SATURATION: 97 % | WEIGHT: 268 LBS | SYSTOLIC BLOOD PRESSURE: 136 MMHG | HEIGHT: 74 IN | DIASTOLIC BLOOD PRESSURE: 82 MMHG | RESPIRATION RATE: 16 BRPM

## 2022-11-08 DIAGNOSIS — Z13.220 SCREENING FOR CHOLESTEROL LEVEL: ICD-10-CM

## 2022-11-08 DIAGNOSIS — I10 HYPERTENSION, UNCONTROLLED: Primary | ICD-10-CM

## 2022-11-08 DIAGNOSIS — Z23 ENCOUNTER FOR IMMUNIZATION: ICD-10-CM

## 2022-11-08 DIAGNOSIS — Z23 NEEDS FLU SHOT: ICD-10-CM

## 2022-11-08 DIAGNOSIS — E66.01 SEVERE OBESITY (HCC): ICD-10-CM

## 2022-11-08 DIAGNOSIS — Z11.59 ENCOUNTER FOR HEPATITIS C SCREENING TEST FOR LOW RISK PATIENT: ICD-10-CM

## 2022-11-08 DIAGNOSIS — D17.0 LIPOMA OF NECK: ICD-10-CM

## 2022-11-08 LAB
CHOLEST SERPL-MCNC: 174 MG/DL
HDLC SERPL-MCNC: 41 MG/DL (ref 40–60)
HDLC SERPL: 4.2 {RATIO} (ref 0–5)
LDLC SERPL CALC-MCNC: 91.4 MG/DL (ref 0–100)
LIPID PROFILE,FLP: ABNORMAL
TRIGL SERPL-MCNC: 208 MG/DL (ref ?–150)
VLDLC SERPL CALC-MCNC: 41.6 MG/DL

## 2022-11-08 PROCEDURE — 86803 HEPATITIS C AB TEST: CPT

## 2022-11-08 PROCEDURE — 3075F SYST BP GE 130 - 139MM HG: CPT | Performed by: EMERGENCY MEDICINE

## 2022-11-08 PROCEDURE — 99213 OFFICE O/P EST LOW 20 MIN: CPT | Performed by: EMERGENCY MEDICINE

## 2022-11-08 PROCEDURE — 36415 COLL VENOUS BLD VENIPUNCTURE: CPT

## 2022-11-08 PROCEDURE — 90686 IIV4 VACC NO PRSV 0.5 ML IM: CPT | Performed by: EMERGENCY MEDICINE

## 2022-11-08 PROCEDURE — 80061 LIPID PANEL: CPT

## 2022-11-08 PROCEDURE — 3079F DIAST BP 80-89 MM HG: CPT | Performed by: EMERGENCY MEDICINE

## 2022-11-08 RX ORDER — LISINOPRIL AND HYDROCHLOROTHIAZIDE 20; 25 MG/1; MG/1
1 TABLET ORAL DAILY
Qty: 90 TABLET | Refills: 0 | Status: SHIPPED | OUTPATIENT
Start: 2022-11-08 | End: 2022-11-20

## 2022-11-08 NOTE — PATIENT INSTRUCTIONS
Learning About High Blood Pressure  It's normal for blood pressure to go up and down throughout the day. But if it stays up, you have high blood pressure (hypertension). High blood pressure means that your blood is pressing on your arteries with too much force. Usually it doesn't cause symptoms. But over time, it can cause damage. High blood pressure increases the risk of stroke, heart attack, vision loss, and dementia. Your doctor will give you a goal for your blood pressure. Your goal will be based on your health and age. Eating healthy foods, not smoking, and being active can help lower blood pressure. You might also take medicine to reach your blood pressure goal.  Tips for preventing high blood pressure    Stay at a healthy weight. Talk to your doctor about what a healthy weight is for you. Limit salt (sodium). Use no or less salt in recipes. And buy foods labeled \"unsalted,\" \"sodium-free,\" or \"low-sodium. \" Foods labeled \"reduced-sodium\" and \"light sodium\" may still have too much sodium. Find new ways to flavor food. Try garlic, lemon juice, onion, vinegar, herbs, and spices instead of salt. Avoid things like soy sauce, steak sauce, mustard, and ketchup. Get at least 30 minutes of exercise on most days of the week. Walking is a good choice. Swimming, running, or team sports may also work for you. Limit alcohol. Men should have no more than 2 drinks a day. Women should have no more than 1. Eat plenty of fruits, vegetables, and low-fat dairy products. Eat less saturated fat (like red meat and full fat dairy), and limit sweets and sugary beverages. Understanding the numbers  Two numbers tell you your blood pressure. The first (top) number shows how hard the blood pushes on your artery walls when your heart pumps. The second (bottom) number shows how hard the blood pushes on your artery walls between heartbeats, when your heart relaxes. Where can you learn more?   Go to http://www.gray.com/  Enter P501 in the search box to learn more about \"Learning About High Blood Pressure. \"  Current as of: October 6, 2021               Content Version: 13.4  © 2672-2060 [employer]. Care instructions adapted under license by [employer] (which disclaims liability or warranty for this information). If you have questions about a medical condition or this instruction, always ask your healthcare professional. [place] [address] any warranty or liability for your use of this information.

## 2022-11-08 NOTE — PROGRESS NOTES
1. \"Have you been to the ER, urgent care clinic since your last visit? Hospitalized since your last visit? \" No    2. \"Have you seen or consulted any other health care providers outside of the [address] [place] [address] since your last visit? \" No     3. For patients aged 39-70: Has the patient had a colonoscopy / FIT/ Cologuard? NA - based on age      If the patient is female:    4. For patients aged 41-77: Has the patient had a mammogram within the past 2 years? NA - based on age or sex      11. For patients aged 21-65: Has the patient had a pap smear?  NA - based on age or sex

## 2022-11-09 LAB
HCV AB SER IA-ACNC: 0.17 INDEX
HCV AB SERPL QL IA: NEGATIVE
HCV COMMENT,HCGAC: NORMAL

## 2022-11-20 NOTE — PROGRESS NOTES
Please call. Cholesterol looks good except for slightly elevated triglycerides which we will treat with diet at this time decreasing red meat saturated fats and increasing whole grains vegetables fruit fish and chicken. No medication. Hep C test was negative also.   Follow-up next visit

## 2023-04-18 LAB — HBA1C MFR BLD HPLC: 4.5 %

## 2023-05-01 ASSESSMENT — ENCOUNTER SYMPTOMS
ABDOMINAL PAIN: 0
SHORTNESS OF BREATH: 0
COUGH: 0
BLOOD IN STOOL: 0
EYE ITCHING: 0
APNEA: 0
EYE REDNESS: 0
CONSTIPATION: 0
WHEEZING: 0
COLOR CHANGE: 0
DIARRHEA: 0
SORE THROAT: 0
EYE DISCHARGE: 0

## 2023-05-01 NOTE — PATIENT INSTRUCTIONS

## 2023-05-02 ENCOUNTER — OFFICE VISIT (OUTPATIENT)
Facility: CLINIC | Age: 43
End: 2023-05-02
Payer: COMMERCIAL

## 2023-05-02 VITALS
HEART RATE: 92 BPM | TEMPERATURE: 97 F | RESPIRATION RATE: 16 BRPM | BODY MASS INDEX: 34.91 KG/M2 | DIASTOLIC BLOOD PRESSURE: 107 MMHG | OXYGEN SATURATION: 96 % | SYSTOLIC BLOOD PRESSURE: 152 MMHG | HEIGHT: 74 IN | WEIGHT: 272 LBS

## 2023-05-02 DIAGNOSIS — E78.1 HYPERTRIGLYCERIDEMIA: ICD-10-CM

## 2023-05-02 DIAGNOSIS — I10 ESSENTIAL (PRIMARY) HYPERTENSION: ICD-10-CM

## 2023-05-02 DIAGNOSIS — I10 ESSENTIAL (PRIMARY) HYPERTENSION: Primary | ICD-10-CM

## 2023-05-02 DIAGNOSIS — E66.01 MORBID (SEVERE) OBESITY DUE TO EXCESS CALORIES (HCC): ICD-10-CM

## 2023-05-02 PROCEDURE — 3075F SYST BP GE 130 - 139MM HG: CPT | Performed by: EMERGENCY MEDICINE

## 2023-05-02 PROCEDURE — 3080F DIAST BP >= 90 MM HG: CPT | Performed by: EMERGENCY MEDICINE

## 2023-05-02 PROCEDURE — 99213 OFFICE O/P EST LOW 20 MIN: CPT | Performed by: EMERGENCY MEDICINE

## 2023-05-02 RX ORDER — LISINOPRIL 40 MG/1
40 TABLET ORAL DAILY
Qty: 90 TABLET | Refills: 1 | Status: SHIPPED | OUTPATIENT
Start: 2023-05-02 | End: 2023-05-02 | Stop reason: SDUPTHER

## 2023-05-02 RX ORDER — HYDROCHLOROTHIAZIDE 25 MG/1
25 TABLET ORAL EVERY MORNING
Qty: 90 TABLET | Refills: 3 | Status: SHIPPED | OUTPATIENT
Start: 2023-05-02

## 2023-05-02 RX ORDER — LISINOPRIL 40 MG/1
40 TABLET ORAL DAILY
Qty: 90 TABLET | Refills: 3 | Status: SHIPPED | OUTPATIENT
Start: 2023-05-02

## 2023-05-02 RX ORDER — HYDROCHLOROTHIAZIDE 25 MG/1
25 TABLET ORAL EVERY MORNING
Qty: 90 TABLET | Refills: 1 | Status: SHIPPED | OUTPATIENT
Start: 2023-05-02 | End: 2023-05-02 | Stop reason: SDUPTHER

## 2023-05-02 SDOH — ECONOMIC STABILITY: FOOD INSECURITY: WITHIN THE PAST 12 MONTHS, YOU WORRIED THAT YOUR FOOD WOULD RUN OUT BEFORE YOU GOT MONEY TO BUY MORE.: NEVER TRUE

## 2023-05-02 SDOH — ECONOMIC STABILITY: INCOME INSECURITY: HOW HARD IS IT FOR YOU TO PAY FOR THE VERY BASICS LIKE FOOD, HOUSING, MEDICAL CARE, AND HEATING?: NOT HARD AT ALL

## 2023-05-02 SDOH — ECONOMIC STABILITY: HOUSING INSECURITY
IN THE LAST 12 MONTHS, WAS THERE A TIME WHEN YOU DID NOT HAVE A STEADY PLACE TO SLEEP OR SLEPT IN A SHELTER (INCLUDING NOW)?: NO

## 2023-05-02 SDOH — ECONOMIC STABILITY: FOOD INSECURITY: WITHIN THE PAST 12 MONTHS, THE FOOD YOU BOUGHT JUST DIDN'T LAST AND YOU DIDN'T HAVE MONEY TO GET MORE.: NEVER TRUE

## 2023-05-02 ASSESSMENT — PATIENT HEALTH QUESTIONNAIRE - PHQ9
SUM OF ALL RESPONSES TO PHQ QUESTIONS 1-9: 0
1. LITTLE INTEREST OR PLEASURE IN DOING THINGS: 0
SUM OF ALL RESPONSES TO PHQ QUESTIONS 1-9: 0
2. FEELING DOWN, DEPRESSED OR HOPELESS: 0
SUM OF ALL RESPONSES TO PHQ9 QUESTIONS 1 & 2: 0
SUM OF ALL RESPONSES TO PHQ QUESTIONS 1-9: 0
SUM OF ALL RESPONSES TO PHQ QUESTIONS 1-9: 0

## 2023-06-05 RX ORDER — LISINOPRIL AND HYDROCHLOROTHIAZIDE 25; 20 MG/1; MG/1
TABLET ORAL
Qty: 90 TABLET | Refills: 3 | Status: SHIPPED | OUTPATIENT
Start: 2023-06-05

## 2023-06-28 RX ORDER — AMLODIPINE BESYLATE 10 MG/1
TABLET ORAL
Qty: 90 TABLET | Refills: 3 | Status: SHIPPED | OUTPATIENT
Start: 2023-06-28

## 2023-07-02 RX ORDER — AMLODIPINE BESYLATE 10 MG/1
TABLET ORAL
Qty: 30 TABLET | OUTPATIENT
Start: 2023-07-02

## 2023-12-08 RX ORDER — LISINOPRIL AND HYDROCHLOROTHIAZIDE 25; 20 MG/1; MG/1
TABLET ORAL
Qty: 90 TABLET | Refills: 3 | Status: SHIPPED | OUTPATIENT
Start: 2023-12-08

## 2024-03-15 DIAGNOSIS — I10 ESSENTIAL (PRIMARY) HYPERTENSION: ICD-10-CM

## 2024-03-15 RX ORDER — LISINOPRIL 40 MG/1
40 TABLET ORAL DAILY
Qty: 90 TABLET | Refills: 3 | Status: SHIPPED | OUTPATIENT
Start: 2024-03-15

## 2024-03-15 NOTE — TELEPHONE ENCOUNTER
Follow up on previous message medication request     Patient wife myriam request refill on medication         Lisinopril        Location      [facility]         Stated completed out and suffering with headache

## 2024-03-15 NOTE — TELEPHONE ENCOUNTER
Patient wife myriam request refill on medication       Lisinopril      Location     [facility]       Stated completed out and suffering with headache

## 2024-03-19 RX ORDER — LISINOPRIL 40 MG/1
40 TABLET ORAL DAILY
Qty: 90 TABLET | Refills: 3 | Status: SHIPPED | OUTPATIENT
Start: 2024-03-19

## 2024-03-21 RX ORDER — LISINOPRIL AND HYDROCHLOROTHIAZIDE 25; 20 MG/1; MG/1
TABLET ORAL
Qty: 90 TABLET | Refills: 3 | Status: SHIPPED | OUTPATIENT
Start: 2024-03-21

## 2024-06-27 RX ORDER — AMLODIPINE BESYLATE 10 MG/1
10 TABLET ORAL DAILY
Qty: 90 TABLET | Refills: 1 | Status: SHIPPED | OUTPATIENT
Start: 2024-06-27

## 2024-12-20 RX ORDER — AMLODIPINE BESYLATE 10 MG/1
10 TABLET ORAL DAILY
Qty: 90 TABLET | Refills: 1 | Status: SHIPPED | OUTPATIENT
Start: 2024-12-20

## 2025-02-24 RX ORDER — LISINOPRIL AND HYDROCHLOROTHIAZIDE 20; 25 MG/1; MG/1
TABLET ORAL
Qty: 90 TABLET | Refills: 3 | OUTPATIENT
Start: 2025-02-24

## 2025-03-19 RX ORDER — LISINOPRIL AND HYDROCHLOROTHIAZIDE 20; 25 MG/1; MG/1
1 TABLET ORAL DAILY
Qty: 90 TABLET | Refills: 3 | OUTPATIENT
Start: 2025-03-19

## 2025-04-02 RX ORDER — LISINOPRIL AND HYDROCHLOROTHIAZIDE 20; 25 MG/1; MG/1
1 TABLET ORAL DAILY
Qty: 90 TABLET | Refills: 3 | OUTPATIENT
Start: 2025-04-02

## 2025-06-17 ENCOUNTER — TELEPHONE (OUTPATIENT)
Facility: CLINIC | Age: 45
End: 2025-06-17

## 2025-06-17 NOTE — TELEPHONE ENCOUNTER
Addended by: ANAYELI ROSARIO on: 2/5/2021 12:18 PM     Modules accepted: Orders     Pt request refill on medication updated pharmacy     lisinopril (PRINIVIL;ZESTRIL) 40 MG tablet     lisinopril-hydroCHLOROthiazide (PRINZIDE;ZESTORETIC) 20-25 MG per tablet      Location     [facility]

## 2025-06-18 ENCOUNTER — TELEPHONE (OUTPATIENT)
Facility: CLINIC | Age: 45
End: 2025-06-18

## 2025-06-20 ENCOUNTER — TELEPHONE (OUTPATIENT)
Facility: CLINIC | Age: 45
End: 2025-06-20

## 2025-06-20 DIAGNOSIS — I10 ESSENTIAL (PRIMARY) HYPERTENSION: ICD-10-CM

## 2025-06-20 NOTE — TELEPHONE ENCOUNTER
URGENT    Pt's spouse stated pt is completley out of lisinopril and gets bad headaches without the medication    Pt's spouse stated she does not want pt to go the entire weekend without the medication     Pt's spouse requesting refill    lisinopril (PRINIVIL;ZESTRIL) 40 MG tablet        [facility]    [address] [place] [address]    [phone]    *Confirmed that pt has scheduled appt 07/28/2025 @ 12:40 PM

## 2025-06-21 DIAGNOSIS — I10 ESSENTIAL (PRIMARY) HYPERTENSION: ICD-10-CM

## 2025-06-21 RX ORDER — LISINOPRIL 40 MG/1
40 TABLET ORAL DAILY
Qty: 90 TABLET | Refills: 3 | Status: SHIPPED | OUTPATIENT
Start: 2025-06-21 | End: 2025-06-21

## 2025-06-21 RX ORDER — LISINOPRIL 40 MG/1
40 TABLET ORAL DAILY
Qty: 30 TABLET | Refills: 0 | Status: SHIPPED | OUTPATIENT
Start: 2025-06-21

## 2025-06-23 RX ORDER — LISINOPRIL 40 MG/1
40 TABLET ORAL DAILY
Qty: 90 TABLET | Refills: 3 | OUTPATIENT
Start: 2025-06-23

## 2025-07-08 ENCOUNTER — OFFICE VISIT (OUTPATIENT)
Facility: CLINIC | Age: 45
End: 2025-07-08
Payer: MEDICAID

## 2025-07-08 VITALS
BODY MASS INDEX: 32.85 KG/M2 | HEART RATE: 72 BPM | SYSTOLIC BLOOD PRESSURE: 151 MMHG | RESPIRATION RATE: 17 BRPM | OXYGEN SATURATION: 96 % | WEIGHT: 256 LBS | TEMPERATURE: 98.1 F | HEIGHT: 74 IN | DIASTOLIC BLOOD PRESSURE: 79 MMHG

## 2025-07-08 DIAGNOSIS — I10 PRIMARY HYPERTENSION: ICD-10-CM

## 2025-07-08 DIAGNOSIS — Z00.00 ANNUAL PHYSICAL EXAM: Primary | ICD-10-CM

## 2025-07-08 DIAGNOSIS — E66.09 CLASS 1 OBESITY DUE TO EXCESS CALORIES WITH SERIOUS COMORBIDITY AND BODY MASS INDEX (BMI) OF 32.0 TO 32.9 IN ADULT: ICD-10-CM

## 2025-07-08 DIAGNOSIS — E66.811 CLASS 1 OBESITY DUE TO EXCESS CALORIES WITH SERIOUS COMORBIDITY AND BODY MASS INDEX (BMI) OF 32.0 TO 32.9 IN ADULT: ICD-10-CM

## 2025-07-08 PROBLEM — R59.1 LYMPHADENOPATHY: Status: ACTIVE | Noted: 2025-07-08

## 2025-07-08 PROCEDURE — 99396 PREV VISIT EST AGE 40-64: CPT | Performed by: STUDENT IN AN ORGANIZED HEALTH CARE EDUCATION/TRAINING PROGRAM

## 2025-07-08 PROCEDURE — 3077F SYST BP >= 140 MM HG: CPT | Performed by: STUDENT IN AN ORGANIZED HEALTH CARE EDUCATION/TRAINING PROGRAM

## 2025-07-08 PROCEDURE — 3078F DIAST BP <80 MM HG: CPT | Performed by: STUDENT IN AN ORGANIZED HEALTH CARE EDUCATION/TRAINING PROGRAM

## 2025-07-08 PROCEDURE — 99213 OFFICE O/P EST LOW 20 MIN: CPT | Performed by: STUDENT IN AN ORGANIZED HEALTH CARE EDUCATION/TRAINING PROGRAM

## 2025-07-08 RX ORDER — LISINOPRIL 40 MG/1
40 TABLET ORAL DAILY
Qty: 90 TABLET | Refills: 0 | Status: SHIPPED | OUTPATIENT
Start: 2025-07-08

## 2025-07-08 RX ORDER — AMLODIPINE BESYLATE 10 MG/1
10 TABLET ORAL DAILY
Qty: 90 TABLET | Refills: 0 | Status: SHIPPED | OUTPATIENT
Start: 2025-07-08

## 2025-07-08 RX ORDER — CHLORTHALIDONE 25 MG/1
25 TABLET ORAL DAILY
Qty: 30 TABLET | Refills: 2 | Status: SHIPPED | OUTPATIENT
Start: 2025-07-08

## 2025-07-08 SDOH — ECONOMIC STABILITY: FOOD INSECURITY: WITHIN THE PAST 12 MONTHS, THE FOOD YOU BOUGHT JUST DIDN'T LAST AND YOU DIDN'T HAVE MONEY TO GET MORE.: NEVER TRUE

## 2025-07-08 SDOH — ECONOMIC STABILITY: FOOD INSECURITY: WITHIN THE PAST 12 MONTHS, YOU WORRIED THAT YOUR FOOD WOULD RUN OUT BEFORE YOU GOT MONEY TO BUY MORE.: NEVER TRUE

## 2025-07-08 SDOH — HEALTH STABILITY: PHYSICAL HEALTH: ON AVERAGE, HOW MANY DAYS PER WEEK DO YOU ENGAGE IN MODERATE TO STRENUOUS EXERCISE (LIKE A BRISK WALK)?: 4 DAYS

## 2025-07-08 SDOH — HEALTH STABILITY: PHYSICAL HEALTH: ON AVERAGE, HOW MANY MINUTES DO YOU ENGAGE IN EXERCISE AT THIS LEVEL?: 40 MIN

## 2025-07-08 ASSESSMENT — PATIENT HEALTH QUESTIONNAIRE - PHQ9
1. LITTLE INTEREST OR PLEASURE IN DOING THINGS: NOT AT ALL
2. FEELING DOWN, DEPRESSED OR HOPELESS: NOT AT ALL
SUM OF ALL RESPONSES TO PHQ QUESTIONS 1-9: 0

## 2025-07-08 NOTE — PROGRESS NOTES
NEW PATIENT, new to provider    History of Present Illness    Chief Complaint   Patient presents with    New Patient     Est care       History of Present Illness  The patient presents for establishment of care, physical and evaluation of hypertension.  He have not seen a provider in over 1.5 years and have been unable to see his last PCP so he would like to change PCP.   Per patient:     He has been experiencing unilateral lymph node swelling since receiving a COVID-19 booster shot a few years ago. The swelling occasionally subsides but recurs intermittently. He is currently taking Dherbs lymphatic and blood pills to manage this condition.    He has not had recent blood work and is interested in checking for any potential infections. He had a cracked tooth that was causing discomfort about 2 weeks ago. Despite the tooth being cracked for approximately 5 months, it has not been extracted yet. An infection developed, and he visited his dentist 2 weeks ago. The dentist confirmed the infection and prescribed amoxicillin, which he took for 10 days. During this period, he experienced heart palpitations and insomnia, which were not alleviated by magnesium or vitamin D supplements. However, these symptoms resolved after completing the course of amoxicillin. He has a follow-up dental appointment scheduled for 07/15/2025 to have the tooth extracted. He also reports feeling something unusual in his upper tooth and is interested in having updated blood work done.    He was previously on lisinopril and amlodipine, but a third medication (HCTZ) prescribed by Dr. Singh caused severe swelling in his ankles, leading him to discontinue its use. It has been over a year since his last consultation with Dr. Singh. His blood pressure was recently checked and found to be higher than normal. He recalls that his triglyceride levels were slightly elevated during his last blood work, prompting him to reduce his intake of starches and

## 2025-07-08 NOTE — PROGRESS NOTES
Pawel Hancock presents today for No chief complaint on file.        Is someone accompanying this pt? no    Is the patient using any DME equipment during OV? no    Depression Screenin/2/2023     2:44 PM 2022    11:19 AM   PHQ-9 Questionaire   Little interest or pleasure in doing things 0 0   Feeling down, depressed, or hopeless 0 0   PHQ-9 Total Score 0 0        BANDAR 7-Anxiety        No data to display                 Travel Screening:    Travel Screening     No screening recorded since 25 0000       Travel History   Travel since 25    No documented travel since 25          Health Maintenance reviewed and discussed and ordered per Provider.  Transportation Needs: Unknown (2023)    PRAPARE - Transportation     Lack of Transportation (Medical): Not on file     Lack of Transportation (Non-Medical): No      Food Insecurity: Not on file (2023)     Financial Resource Strain: Low Risk  (2023)    Overall Financial Resource Strain (CARDIA)     Difficulty of Paying Living Expenses: Not hard at all     Housing Stability: Unknown (2023)    Housing Stability Vital Sign     Unable to Pay for Housing in the Last Year: Not on file     Number of Places Lived in the Last Year: Not on file     Unstable Housing in the Last Year: No       Did you provide resources if patient requested them? None requested      Health Maintenance Due   Topic Date Due    Depression Screen  Never done    Varicella vaccine (1 of 2 - 13+ 2-dose series) Never done    Hepatitis B vaccine (1 of 3 - 19+ 3-dose series) Never done    DTaP/Tdap/Td vaccine (1 - Tdap) Never done    COVID-19 Vaccine (3 - 2024-25 season) 2024   .      \"Have you been to the ER, urgent care clinic since your last visit?  Hospitalized since your last visit?\"    NO    “Have you seen or consulted any other health care providers outside of [facility] since your last visit?”    NO

## 2025-07-09 LAB
ALBUMIN SERPL-MCNC: 4.5 G/DL (ref 4.1–5.1)
ALP SERPL-CCNC: 77 IU/L (ref 44–121)
ALT SERPL-CCNC: 10 IU/L (ref 0–44)
AST SERPL-CCNC: 14 IU/L (ref 0–40)
BASOPHILS # BLD AUTO: 0 X10E3/UL (ref 0–0.2)
BASOPHILS NFR BLD AUTO: 0 %
BILIRUB SERPL-MCNC: 0.6 MG/DL (ref 0–1.2)
BUN SERPL-MCNC: 8 MG/DL (ref 6–24)
BUN/CREAT SERPL: 8 (ref 9–20)
CALCIUM SERPL-MCNC: 10 MG/DL (ref 8.7–10.2)
CHLORIDE SERPL-SCNC: 105 MMOL/L (ref 96–106)
CHOLEST SERPL-MCNC: 118 MG/DL (ref 100–199)
CO2 SERPL-SCNC: 23 MMOL/L (ref 20–29)
CREAT SERPL-MCNC: 0.99 MG/DL (ref 0.76–1.27)
EGFRCR SERPLBLD CKD-EPI 2021: 96 ML/MIN/1.73
EOSINOPHIL # BLD AUTO: 0 X10E3/UL (ref 0–0.4)
EOSINOPHIL NFR BLD AUTO: 0 %
ERYTHROCYTE [DISTWIDTH] IN BLOOD BY AUTOMATED COUNT: 11.7 % (ref 11.6–15.4)
GLOBULIN SER CALC-MCNC: 2.9 G/DL (ref 1.5–4.5)
GLUCOSE SERPL-MCNC: 85 MG/DL (ref 70–99)
HCT VFR BLD AUTO: 44.3 % (ref 37.5–51)
HDLC SERPL-MCNC: 37 MG/DL
HGB BLD-MCNC: 13.8 G/DL (ref 13–17.7)
IMM GRANULOCYTES # BLD AUTO: 0 X10E3/UL (ref 0–0.1)
IMM GRANULOCYTES NFR BLD AUTO: 0 %
LDLC SERPL CALC-MCNC: 64 MG/DL (ref 0–99)
LYMPHOCYTES # BLD AUTO: 1 X10E3/UL (ref 0.7–3.1)
LYMPHOCYTES NFR BLD AUTO: 27 %
MCH RBC QN AUTO: 29.5 PG (ref 26.6–33)
MCHC RBC AUTO-ENTMCNC: 31.2 G/DL (ref 31.5–35.7)
MCV RBC AUTO: 95 FL (ref 79–97)
MONOCYTES # BLD AUTO: 0.4 X10E3/UL (ref 0.1–0.9)
MONOCYTES NFR BLD AUTO: 11 %
NEUTROPHILS # BLD AUTO: 2.4 X10E3/UL (ref 1.4–7)
NEUTROPHILS NFR BLD AUTO: 62 %
PLATELET # BLD AUTO: 202 X10E3/UL (ref 150–450)
POTASSIUM SERPL-SCNC: 4.3 MMOL/L (ref 3.5–5.2)
PROT SERPL-MCNC: 7.4 G/DL (ref 6–8.5)
RBC # BLD AUTO: 4.68 X10E6/UL (ref 4.14–5.8)
SODIUM SERPL-SCNC: 142 MMOL/L (ref 134–144)
TRIGL SERPL-MCNC: 88 MG/DL (ref 0–149)
VLDLC SERPL CALC-MCNC: 17 MG/DL (ref 5–40)
WBC # BLD AUTO: 3.9 X10E3/UL (ref 3.4–10.8)

## 2025-07-10 ENCOUNTER — TELEPHONE (OUTPATIENT)
Facility: CLINIC | Age: 45
End: 2025-07-10

## 2025-07-10 ENCOUNTER — TELEMEDICINE (OUTPATIENT)
Facility: CLINIC | Age: 45
End: 2025-07-10
Payer: MEDICAID

## 2025-07-10 DIAGNOSIS — M27.2 ODONTOGENIC INFECTION OF JAW: Primary | ICD-10-CM

## 2025-07-10 PROCEDURE — 99213 OFFICE O/P EST LOW 20 MIN: CPT | Performed by: STUDENT IN AN ORGANIZED HEALTH CARE EDUCATION/TRAINING PROGRAM

## 2025-07-10 SDOH — ECONOMIC STABILITY: FOOD INSECURITY: WITHIN THE PAST 12 MONTHS, YOU WORRIED THAT YOUR FOOD WOULD RUN OUT BEFORE YOU GOT MONEY TO BUY MORE.: NEVER TRUE

## 2025-07-10 SDOH — ECONOMIC STABILITY: FOOD INSECURITY: WITHIN THE PAST 12 MONTHS, THE FOOD YOU BOUGHT JUST DIDN'T LAST AND YOU DIDN'T HAVE MONEY TO GET MORE.: NEVER TRUE

## 2025-07-10 ASSESSMENT — PATIENT HEALTH QUESTIONNAIRE - PHQ9
SUM OF ALL RESPONSES TO PHQ QUESTIONS 1-9: 0
1. LITTLE INTEREST OR PLEASURE IN DOING THINGS: NOT AT ALL
SUM OF ALL RESPONSES TO PHQ QUESTIONS 1-9: 0
2. FEELING DOWN, DEPRESSED OR HOPELESS: NOT AT ALL

## 2025-07-10 NOTE — PROGRESS NOTES
SICK VISIT - video     Pawel Hancock (: 1980) is a 44 y.o. male here for evaluation of the following chief concerns(s):  Acute Care (Requesting amoxicillin)     The patient is briefed of limitation with video visit and would like to proceed as he is busy from work and unable to make it. He is in [place]. Personal in video visit includes the patient and myself, no other personnel.      ASSESSMENT/PLAN:  1. Odontogenic infection of jaw  -     amoxicillin-clavulanate (AUGMENTIN) 875-125 MG per tablet; Take 1 tablet by mouth 2 times daily for 5 days, Disp-10 tablet, R-0Normal    No orders of the defined types were placed in this encounter.    Assessment & Plan  1. Tooth infection.  He reports pain and mild swelling in his tooth, with no fever or chills. A course of Augmentin 875 mg twice daily for 5 days has been prescribed. He is advised to contact the office if there is no improvement.      FU as scheduled for chronic disease    Patient agrees with plan as above and has no additional questions at this time.     SUBJECTIVE/OBJECTIVE:    History of Present Illness  The patient presents via video visit for a tooth infection. He was seeing his dentist and was planned to get his tooth extraction. He completed a course of amoxicillin about a week ago, however, the infection has come back. The patient is having pain and some mild swelling on his tooth and his dentist's office is closed for a week. The patient is asking us to assist in helping with the infection management until he gets his teeth extracted next Tuesday. Other than mild swelling and pain, no other symptoms, no fever, no chills.             There were no vitals filed for this visit.   There is no height or weight on file to calculate BMI.     Physical Exam  Infection noted in the right jaw and one of the incisor teeth.      Medical decision making complexity: moderate    I have discussed the diagnosis with the patient and the intended plan as seen in

## 2025-07-10 NOTE — TELEPHONE ENCOUNTER
Called pt and spoke with pt wife and Joyce/pt wife verified pt by name and .  Pt was scheduled a video visit with Dr. Mendoza today 7/10/2025 at 11:45 to discuss pt concern and request for amoxicillin.

## 2025-07-10 NOTE — TELEPHONE ENCOUNTER
Pt wife called in and stated that pt is still in pain due to an tooth infection. Pt wife stated that the dentist is closed until next week and was wondering could he get another refill on the Amoxicillin that was given to him at the time of his appt. Please advise pt wife.

## 2025-07-10 NOTE — PROGRESS NOTES
Pawel Hancock presents today for No chief complaint on file.        Is someone accompanying this pt? no    Is the patient using any DME equipment during OV? no    Depression Screenin/8/2025     9:25 AM 2023     2:44 PM 2022    11:19 AM   PHQ-9 Questionaire   Little interest or pleasure in doing things 0 0 0   Feeling down, depressed, or hopeless 0 0 0   PHQ-9 Total Score 0 0 0        BANDAR 7-Anxiety       2025     9:25 AM   BANDAR-7 SCREENING   Feeling nervous, anxious, or on edge Not at all   Not being able to stop or control worrying Not at all   Worrying too much about different things Not at all   Trouble relaxing Not at all   Being so restless that it is hard to sit still Not at all   Becoming easily annoyed or irritable Not at all   Feeling afraid as if something awful might happen Not at all   BANDAR-7 Total Score 0   How difficult have these problems made it for you to do your work, take care of things at home, or get along with other people? Not difficult at all        Travel Screening:    Travel Screening     No screening recorded since 25 0000       Travel History   Travel since 06/10/25    No documented travel since 06/10/25          Health Maintenance reviewed and discussed and ordered per Provider.  Transportation Needs: No Transportation Needs (2025)    PRAPARE - Transportation     Lack of Transportation (Medical): No     Lack of Transportation (Non-Medical): No      Food Insecurity: No Food Insecurity (2025)    Hunger Vital Sign     Worried About Running Out of Food in the Last Year: Never true     Ran Out of Food in the Last Year: Never true     Financial Resource Strain: Low Risk  (2023)    Overall Financial Resource Strain (CARDIA)     Difficulty of Paying Living Expenses: Not hard at all     Housing Stability: Low Risk  (2025)    Housing Stability Vital Sign     Unable to Pay for Housing in the Last Year: No     Number of Times Moved in the Last Year: 0

## 2025-07-22 ENCOUNTER — OFFICE VISIT (OUTPATIENT)
Facility: CLINIC | Age: 45
End: 2025-07-22
Payer: MEDICAID

## 2025-07-22 VITALS
TEMPERATURE: 97.9 F | HEIGHT: 74 IN | OXYGEN SATURATION: 98 % | RESPIRATION RATE: 20 BRPM | BODY MASS INDEX: 32.42 KG/M2 | SYSTOLIC BLOOD PRESSURE: 137 MMHG | HEART RATE: 66 BPM | WEIGHT: 252.6 LBS | DIASTOLIC BLOOD PRESSURE: 86 MMHG

## 2025-07-22 DIAGNOSIS — I10 PRIMARY HYPERTENSION: ICD-10-CM

## 2025-07-22 DIAGNOSIS — E78.6 LOW HDL (UNDER 40): Primary | ICD-10-CM

## 2025-07-22 PROCEDURE — 3075F SYST BP GE 130 - 139MM HG: CPT | Performed by: STUDENT IN AN ORGANIZED HEALTH CARE EDUCATION/TRAINING PROGRAM

## 2025-07-22 PROCEDURE — 3079F DIAST BP 80-89 MM HG: CPT | Performed by: STUDENT IN AN ORGANIZED HEALTH CARE EDUCATION/TRAINING PROGRAM

## 2025-07-22 PROCEDURE — 99213 OFFICE O/P EST LOW 20 MIN: CPT | Performed by: STUDENT IN AN ORGANIZED HEALTH CARE EDUCATION/TRAINING PROGRAM

## 2025-07-22 RX ORDER — CHLORTHALIDONE 25 MG/1
25 TABLET ORAL DAILY
Qty: 90 TABLET | Refills: 0 | Status: SHIPPED | OUTPATIENT
Start: 2025-07-22

## 2025-07-22 SDOH — ECONOMIC STABILITY: FOOD INSECURITY: WITHIN THE PAST 12 MONTHS, YOU WORRIED THAT YOUR FOOD WOULD RUN OUT BEFORE YOU GOT MONEY TO BUY MORE.: NEVER TRUE

## 2025-07-22 SDOH — ECONOMIC STABILITY: FOOD INSECURITY: WITHIN THE PAST 12 MONTHS, THE FOOD YOU BOUGHT JUST DIDN'T LAST AND YOU DIDN'T HAVE MONEY TO GET MORE.: NEVER TRUE

## 2025-07-22 ASSESSMENT — PATIENT HEALTH QUESTIONNAIRE - PHQ9
SUM OF ALL RESPONSES TO PHQ QUESTIONS 1-9: 0
2. FEELING DOWN, DEPRESSED OR HOPELESS: NOT AT ALL
SUM OF ALL RESPONSES TO PHQ QUESTIONS 1-9: 0
1. LITTLE INTEREST OR PLEASURE IN DOING THINGS: NOT AT ALL

## 2025-07-22 NOTE — PROGRESS NOTES
Pawel Hancock presents today for No chief complaint on file.        Is someone accompanying this pt? no    Is the patient using any DME equipment during OV? no    Depression Screenin/10/2025    11:53 AM 2025     9:25 AM 2023     2:44 PM 2022    11:19 AM   PHQ-9 Questionaire   Little interest or pleasure in doing things 0 0 0 0   Feeling down, depressed, or hopeless 0 0 0 0   PHQ-9 Total Score 0 0 0 0        BANDAR 7-Anxiety       7/10/2025    11:53 AM 2025     9:25 AM   BANDAR-7 SCREENING   Feeling nervous, anxious, or on edge Not at all Not at all   Not being able to stop or control worrying Not at all Not at all   Worrying too much about different things Not at all Not at all   Trouble relaxing Not at all Not at all   Being so restless that it is hard to sit still Not at all Not at all   Becoming easily annoyed or irritable Not at all Not at all   Feeling afraid as if something awful might happen Not at all Not at all   BANDAR-7 Total Score 0 0   How difficult have these problems made it for you to do your work, take care of things at home, or get along with other people? Not difficult at all Not difficult at all        Travel Screening:    Travel Screening     No screening recorded since 25 0000       Travel History   Travel since 25    No documented travel since 25          Health Maintenance reviewed and discussed and ordered per Provider.  Transportation Needs: No Transportation Needs (7/10/2025)    PRAPARE - Transportation     Lack of Transportation (Medical): No     Lack of Transportation (Non-Medical): No      Food Insecurity: No Food Insecurity (7/10/2025)    Hunger Vital Sign     Worried About Running Out of Food in the Last Year: Never true     Ran Out of Food in the Last Year: Never true     Financial Resource Strain: Low Risk  (2023)    Overall Financial Resource Strain (CARDIA)     Difficulty of Paying Living Expenses: Not hard at all     Housing Stability: Low

## 2025-07-22 NOTE — PROGRESS NOTES
Follow up     Pawel Hancock (: 1980) is a 44 y.o. male here for evaluation of the following chief concerns(s):  Follow-up (2 week f/u, med check for HTN and lab results )       ASSESSMENT/PLAN:    1. Primary hypertension  -     chlorthalidone (HYGROTON) 25 MG tablet; Take 1 tablet by mouth daily, Disp-90 tablet, R-0Normal    No orders of the defined types were placed in this encounter.      Assessment & Plan   Blood pressure management.  - Blood pressure is well-controlled today.  - Advised to continue with current diet and exercise regimen.  -continue with amlodipine 5, chlorthalidone 25, and lisinopril 40 mg.  - Prescribed a 90-day supply of lisinopril.  - Instructed to inform when medication supply is depleted.    3. Low HDL.  - HDL levels are slightly below the desired range.  - Advised to incorporate more healthy fats into the diet, such as those found in fish, nuts, avocados, and salmon.  - Recommended regular exercise to help increase HDL levels.  - Cholesterol panel shows improvement from .    Follow-up: The patient will follow up in 3 months.      Return in about 3 months (around 10/22/2025) for HTN , 15 minutes.    Patient agrees with plan as above and has no additional questions at this time.     SUBJECTIVE/OBJECTIVE:    History of Present Illness  The patient presents for evaluation of blood pressure management and low HDL.    He has been monitoring his blood work and noticed an increase in his sodium level, even though he does not consume much salt. Significant dietary changes have been made, including the consumption of salmon, wild rice, salads, and soups. He has also incorporated coconut water into his diet as per previous recommendations. Sodas have been eliminated from his diet, and he now drinks fruit juices, water, and occasionally lemonade.    His blood pressure is well-managed today. He has run out of his blood pressure medication and was advised by the nurse to contact [facility] for a

## 2025-07-24 LAB
ALBUMIN/CREAT UR: 2 MG/G CREAT (ref 0–29)
CREAT UR-MCNC: 166.1 MG/DL
MICROALBUMIN UR-MCNC: 3.7 UG/ML

## 2025-08-19 DIAGNOSIS — I10 PRIMARY HYPERTENSION: ICD-10-CM

## 2025-08-19 RX ORDER — LISINOPRIL 40 MG/1
40 TABLET ORAL DAILY
Qty: 90 TABLET | Refills: 0 | Status: SHIPPED | OUTPATIENT
Start: 2025-08-19